# Patient Record
Sex: MALE | Race: WHITE | URBAN - METROPOLITAN AREA
[De-identification: names, ages, dates, MRNs, and addresses within clinical notes are randomized per-mention and may not be internally consistent; named-entity substitution may affect disease eponyms.]

---

## 2023-06-08 ENCOUNTER — OFFICE VISIT (OUTPATIENT)
Dept: URBAN - METROPOLITAN AREA CLINIC 51 | Facility: CLINIC | Age: 28
End: 2023-06-08
Payer: MEDICAID

## 2023-06-08 DIAGNOSIS — H00.14 CHALAZION OF LEFT UPPER EYELID: Primary | ICD-10-CM

## 2023-06-08 PROCEDURE — 92004 COMPRE OPH EXAM NEW PT 1/>: CPT | Performed by: OPTOMETRIST

## 2023-06-08 RX ORDER — DOXYCYCLINE HYCLATE 100 MG/1
100 MG CAPSULE, GELATIN COATED ORAL
Qty: 14 | Refills: 1 | Status: ACTIVE
Start: 2023-06-08

## 2023-06-08 ASSESSMENT — INTRAOCULAR PRESSURE
OD: 16
OS: 17

## 2023-06-08 ASSESSMENT — KERATOMETRY
OS: 42.50
OD: 42.38

## 2023-06-08 ASSESSMENT — VISUAL ACUITY
OS: 20/20
OD: 20/20

## 2023-06-08 NOTE — IMPRESSION/PLAN
Impression: Chalazion of left upper eyelid: H00.14.  Plan: hot compress 
doxy 100 mg qd 
lid scrubs 
lipid based tears